# Patient Record
Sex: MALE | Race: WHITE | NOT HISPANIC OR LATINO
[De-identification: names, ages, dates, MRNs, and addresses within clinical notes are randomized per-mention and may not be internally consistent; named-entity substitution may affect disease eponyms.]

---

## 2024-04-20 ENCOUNTER — NON-APPOINTMENT (OUTPATIENT)
Age: 59
End: 2024-04-20

## 2024-04-23 ENCOUNTER — EMERGENCY (EMERGENCY)
Facility: HOSPITAL | Age: 59
LOS: 1 days | Discharge: HOME | End: 2024-04-23
Attending: STUDENT IN AN ORGANIZED HEALTH CARE EDUCATION/TRAINING PROGRAM
Payer: COMMERCIAL

## 2024-04-23 VITALS
HEART RATE: 92 BPM | HEIGHT: 74 IN | OXYGEN SATURATION: 99 % | RESPIRATION RATE: 16 BRPM | SYSTOLIC BLOOD PRESSURE: 145 MMHG | TEMPERATURE: 99 F | WEIGHT: 190.04 LBS | DIASTOLIC BLOOD PRESSURE: 89 MMHG

## 2024-04-23 PROCEDURE — 99284 EMERGENCY DEPT VISIT MOD MDM: CPT

## 2024-04-23 PROCEDURE — 99283 EMERGENCY DEPT VISIT LOW MDM: CPT

## 2024-04-23 RX ORDER — ACETAMINOPHEN 500 MG
2 TABLET ORAL
Qty: 240 | Refills: 0
Start: 2024-04-23 | End: 2024-05-22

## 2024-04-23 RX ORDER — ACETAMINOPHEN 500 MG
650 TABLET ORAL ONCE
Refills: 0 | Status: COMPLETED | OUTPATIENT
Start: 2024-04-23 | End: 2024-04-23

## 2024-04-23 RX ORDER — IBUPROFEN 200 MG
1 TABLET ORAL
Qty: 42 | Refills: 0
Start: 2024-04-23 | End: 2024-05-06

## 2024-04-23 RX ORDER — ACETAMINOPHEN 500 MG
2 TABLET ORAL
Qty: 112 | Refills: 0
Start: 2024-04-23 | End: 2024-05-06

## 2024-04-23 RX ORDER — DICLOFENAC SODIUM 30 MG/G
2 GEL TOPICAL
Qty: 1 | Refills: 0
Start: 2024-04-23 | End: 2024-04-29

## 2024-04-23 RX ORDER — CYCLOBENZAPRINE HYDROCHLORIDE 10 MG/1
1 TABLET, FILM COATED ORAL
Qty: 14 | Refills: 0
Start: 2024-04-23 | End: 2024-05-06

## 2024-04-23 RX ORDER — DICLOFENAC SODIUM 30 MG/G
2 GEL TOPICAL
Qty: 1 | Refills: 0
Start: 2024-04-23 | End: 2024-05-06

## 2024-04-23 RX ADMIN — Medication 650 MILLIGRAM(S): at 17:00

## 2024-04-23 RX ADMIN — Medication 650 MILLIGRAM(S): at 17:30

## 2024-04-23 NOTE — ED PROVIDER NOTE - NSFOLLOWUPINSTRUCTIONS_ED_ALL_ED_FT
You have been evaluated in the Emergency Department today for your ______ pain after _____. Your pain is most likely muscle strain which will improve on its own.    Please follow up with your primary care physician in 2-3 days.    Please rest, ice, and elevate your ____ to control pain and inflammation.    Return to the ER immediately for worsening or uncontrolled pain, numbness or weakness to your ____, color change to your ____, or for any other concerning symptoms.    Thank you for choosing us for your care You have been evaluated in the Emergency Department today for your neck pain pain. Your pain is most likely muscle strain which will improve on its own.    Please follow up with your primary care physician in 2-3 days.    Please rest and ice your neck, along with take the precribed medication to control pain and inflammation.    Return to the ER immediately for worsening or uncontrolled pain, numbness or weakness to your arms or color change to your hands, or for any other concerning symptoms.    Thank you for choosing us for your care

## 2024-04-23 NOTE — ED PROVIDER NOTE - OBJECTIVE STATEMENT
59 male presents to the ED for neck pain and stiffness.  Patient reports 3 days ago he developed pain in the back of his neck stiffness, associated pain with movement.  Reports he was seen in urgent care 2 days ago prescribed Flexeril 5 mg.  States that Flexeril was not helping so he went to a second urgent care was provided 10 mg of Flexeril, Vicodin and states he still has persistent pain.  Denies trauma numbness weakness or incontinence.    GENERAL APPEARANCE:  AxOx4, generally well-appearing, no acute distress.  HEENT:  NC, AT. MMM. EOMI, clear conjunctiva, oropharynx clear.  NECK:  Supple without lymphadenopathy.  Decreased ROM in extension. Bilateral cervical paravertebral tenderness and palpable muscle spasm.  HEART:  Normal rate and regular rhythm, normal S1/S1, no m/r/g  LUNGS:  CTAB, moving air well. No crackles or wheezes are heard.  ABDOMEN:  Soft, nontender, non-distended.  BACK: No CVAT, no obvious deformity.  EXTREMITIES:  Without cyanosis, clubbing or edema.  NEUROLOGICAL:  Grossly nonfocal. Alert and oriented, moving all 4 extremities. CN II-XII grossly intact. Observed to ambulate with normal gait.  Skin:  Warm and dry without any rash.

## 2024-04-23 NOTE — ED PROVIDER NOTE - CLINICAL SUMMARY MEDICAL DECISION MAKING FREE TEXT BOX
After introducing myself to the patient and/or family I have performed a medical history, review of systems, and physical examination. I have formulated a differential diagnosis and plan of care for this visit and discussed this with the patient and/or family. I have also addressed the risks and benefits of diagnostic and treatment modalities planned for this visit.  Vital Signs: Reviewed the patient's vital signs  Nursing Notes: Reviewed and utilized the nursing notes  Old Medical Records: The patient's available past medical records and past encounters were reviewed  Laboratory Studies: Ordered and independently interpreted laboratory test, see above  Imaging Studies: Imaging studies ordered. Radiologist interpretation reviewed. I have independently reviewed imaging.      Patient has a neck strain and will be discharged home.  Patient has currently been stabilized in the emergency department.    Patient’s symptoms not typical for other emergent conditions such as arterial dissection, osteomyelitis, epidural abscess, c-spine fracture, other spinal emergencies.    Patient will be discharged with strict return precautions and follow up with primary MD within 24 hours for further evaluation.

## 2024-04-23 NOTE — ED PROVIDER NOTE - NSFOLLOWUPCLINICS_GEN_ALL_ED_FT
[facility]  Orthopedics  [address] [place] [address]  Phone: [phone]  Fax: [phone]  Follow Up Time: 4-6 Days

## 2024-04-23 NOTE — ED PROVIDER NOTE - PATIENT PORTAL LINK FT
You can access the FollowMyHealth Patient Portal offered by [facility] by registering at the following website: [email or web address] By joining [email or web address] FollowMyHealth portal, you will also be able to view your health information using other applications (apps) compatible with our system.

## 2024-04-25 ENCOUNTER — APPOINTMENT (OUTPATIENT)
Dept: RADIOLOGY | Facility: CLINIC | Age: 59
End: 2024-04-25
Payer: COMMERCIAL

## 2024-04-25 PROBLEM — Z00.00 ENCOUNTER FOR PREVENTIVE HEALTH EXAMINATION: Status: ACTIVE | Noted: 2024-04-25

## 2024-04-25 PROCEDURE — 72040 X-RAY EXAM NECK SPINE 2-3 VW: CPT
